# Patient Record
Sex: FEMALE | Race: WHITE | NOT HISPANIC OR LATINO | URBAN - METROPOLITAN AREA
[De-identification: names, ages, dates, MRNs, and addresses within clinical notes are randomized per-mention and may not be internally consistent; named-entity substitution may affect disease eponyms.]

---

## 2017-01-06 ENCOUNTER — FOLLOW UP (OUTPATIENT)
Dept: URBAN - METROPOLITAN AREA CLINIC 33 | Facility: CLINIC | Age: 67
End: 2017-01-06

## 2017-01-06 VITALS
HEIGHT: 66 IN | DIASTOLIC BLOOD PRESSURE: 56 MMHG | BODY MASS INDEX: 19.29 KG/M2 | WEIGHT: 120 LBS | HEART RATE: 72 BPM | SYSTOLIC BLOOD PRESSURE: 98 MMHG

## 2017-01-06 DIAGNOSIS — H02.836: ICD-10-CM

## 2017-01-06 DIAGNOSIS — H02.833: ICD-10-CM

## 2017-01-06 DIAGNOSIS — H04.123: ICD-10-CM

## 2017-01-06 DIAGNOSIS — H35.373: ICD-10-CM

## 2017-01-06 DIAGNOSIS — H43.813: ICD-10-CM

## 2017-01-06 DIAGNOSIS — H25.13: ICD-10-CM

## 2017-01-06 PROCEDURE — G8420 CALC BMI NORM PARAMETERS: HCPCS

## 2017-01-06 PROCEDURE — 92014 COMPRE OPH EXAM EST PT 1/>: CPT

## 2017-01-06 PROCEDURE — G8427 DOCREV CUR MEDS BY ELIG CLIN: HCPCS

## 2017-01-06 PROCEDURE — 92250 FUNDUS PHOTOGRAPHY W/I&R: CPT

## 2017-01-06 PROCEDURE — 1036F TOBACCO NON-USER: CPT

## 2017-01-06 ASSESSMENT — TONOMETRY
OS_IOP_MMHG: 10
OD_IOP_MMHG: 12

## 2017-01-06 ASSESSMENT — VISUAL ACUITY
OD_CC: 20/20-2
OS_CC: 20/20+2

## 2017-12-12 NOTE — PATIENT DISCUSSION
New Prescription: Lotemax (loteprednol etabonate): drops,suspension: 0.5% 1 drop twice a day as directed into both eyes 12-

## 2017-12-12 NOTE — PATIENT DISCUSSION
Continue: Samantha Lake (brinzolamide-brimonidine): drops,suspension: 1-0.2% 1 drop twice a day as directed into both eyes

## 2018-01-18 NOTE — PATIENT DISCUSSION
Continue: Olivia Coates (brinzolamide-brimonidine): drops,suspension: 1-0.2% 1 drop twice a day as directed into both eyes

## 2018-01-18 NOTE — PATIENT DISCUSSION
Continue: Lotemax (loteprednol etabonate): drops,suspension: 0.5% 1 drop twice a day as directed into both eyes 12-

## 2018-03-01 NOTE — PATIENT DISCUSSION
Continue: Winn Piper (brinzolamide-brimonidine): drops,suspension: 1-0.2% 1 drop twice a day as directed into both eyes

## 2018-05-24 NOTE — PATIENT DISCUSSION
Continue: Samantha Lake (brinzolamide-brimonidine): drops,suspension: 1-0.2% 1 drop once a day as directed into left eye

## 2018-07-05 NOTE — PATIENT DISCUSSION
Continue: Hesham Trinidad (brinzolamide-brimonidine): drops,suspension: 1-0.2% 1 drop once a day as directed into left eye

## 2018-10-31 ENCOUNTER — FOLLOW UP (OUTPATIENT)
Dept: URBAN - METROPOLITAN AREA CLINIC 33 | Facility: CLINIC | Age: 68
End: 2018-10-31

## 2018-10-31 VITALS — WEIGHT: 120 LBS | HEIGHT: 66 IN | BODY MASS INDEX: 19.29 KG/M2

## 2018-10-31 DIAGNOSIS — H35.373: ICD-10-CM

## 2018-10-31 DIAGNOSIS — H43.813: ICD-10-CM

## 2018-10-31 PROCEDURE — 92014 COMPRE OPH EXAM EST PT 1/>: CPT

## 2018-10-31 PROCEDURE — 92250 FUNDUS PHOTOGRAPHY W/I&R: CPT

## 2018-10-31 ASSESSMENT — VISUAL ACUITY
OS_CC: 20/20
OD_CC: 20/20

## 2018-10-31 ASSESSMENT — TONOMETRY
OS_IOP_MMHG: 09
OD_IOP_MMHG: 09

## 2019-07-23 NOTE — PATIENT DISCUSSION
POAG, OU: INTRAOCULAR PRESSURE IS WITHIN ACCEPTABLE LIMITS. PT TO RETURN FOR FOLLOW-UP AS SCHEDULED.

## 2019-07-29 NOTE — PATIENT DISCUSSION
Monitor for changes.  Updated GLRx given today. Advised patient of condition of cataracts and discussed symptoms of cataracts worsening and becoming more bothersome. Pt to call if vision changes or becomes more bothered by visual symptoms before next appt.

## 2019-07-29 NOTE — PATIENT DISCUSSION
Continue care with PCP. Monitor blood sugar and A1C levels. Recommended yearly dilated eye exams to monitor for ocular complications. Letter sent to PCP with today's findings.

## 2019-10-22 NOTE — PATIENT DISCUSSION
New Prescription: Jaleel Stewart (brinzolamide-brimonidine): drops,suspension: 1-0.2% 1 drop twice a day as directed into left eye 10-

## 2019-11-01 ENCOUNTER — FOLLOW UP (OUTPATIENT)
Dept: URBAN - METROPOLITAN AREA CLINIC 33 | Facility: CLINIC | Age: 69
End: 2019-11-01

## 2019-11-01 VITALS — BODY MASS INDEX: 19.29 KG/M2 | HEIGHT: 66 IN | WEIGHT: 120 LBS

## 2019-11-01 DIAGNOSIS — H43.813: ICD-10-CM

## 2019-11-01 DIAGNOSIS — H35.373: ICD-10-CM

## 2019-11-01 PROCEDURE — 92134 CPTRZ OPH DX IMG PST SGM RTA: CPT

## 2019-11-01 PROCEDURE — 92014 COMPRE OPH EXAM EST PT 1/>: CPT

## 2019-11-01 PROCEDURE — 92250 FUNDUS PHOTOGRAPHY W/I&R: CPT

## 2019-11-01 ASSESSMENT — VISUAL ACUITY
OD_CC: 20/20+1
OS_CC: 20/20

## 2019-11-01 ASSESSMENT — TONOMETRY
OS_IOP_MMHG: 18
OD_IOP_MMHG: 19

## 2020-03-17 NOTE — PATIENT DISCUSSION
Continue: Gadsden Piper (brinzolamide-brimonidine): drops,suspension: 1-0.2% 1 drop twice a day as directed into left eye 10-

## 2021-01-22 NOTE — PATIENT DISCUSSION
Monitor for changes. Discussed symptoms of cataracts worsening and becoming more bothersome. Pt to call if vision changes or becomes more bothered by visual symptoms before next appt.

## 2021-05-14 ENCOUNTER — FOLLOW UP (OUTPATIENT)
Dept: URBAN - METROPOLITAN AREA CLINIC 33 | Facility: CLINIC | Age: 71
End: 2021-05-14

## 2021-05-14 VITALS — WEIGHT: 122 LBS | HEIGHT: 66 IN | BODY MASS INDEX: 19.61 KG/M2

## 2021-05-14 DIAGNOSIS — H35.373: ICD-10-CM

## 2021-05-14 DIAGNOSIS — H43.813: ICD-10-CM

## 2021-05-14 DIAGNOSIS — H25.13: ICD-10-CM

## 2021-05-14 PROCEDURE — 92014 COMPRE OPH EXAM EST PT 1/>: CPT

## 2021-05-14 PROCEDURE — 92134 CPTRZ OPH DX IMG PST SGM RTA: CPT

## 2021-05-14 PROCEDURE — 92250 FUNDUS PHOTOGRAPHY W/I&R: CPT

## 2021-05-14 ASSESSMENT — VISUAL ACUITY
OS_CC: 20/20
OD_CC: 20/20

## 2021-05-14 ASSESSMENT — TONOMETRY
OS_IOP_MMHG: 13
OD_IOP_MMHG: 12

## 2021-07-22 NOTE — PATIENT DISCUSSION
IOP elevated today. Advised Pt of elevated IOP and importance of adding a drop to reduce and control IOP. Explained elevated IOP will cause optic nerve damage and vision loss. Will reevaluate IOP in 2 weeks on new drop.

## 2021-08-04 NOTE — PATIENT DISCUSSION
IOP improved today with addition of Vyzulta and Pt tolerating. Advised Pt of improvement in IOP and importance of continuing drops to reduce and control IOP to protect from further nerve damage and vision loss.

## 2021-08-04 NOTE — PATIENT DISCUSSION
Monitor for changes. Pt to call if vision changes or becomes more bothered by visual symptoms before next appt.

## 2022-07-21 ENCOUNTER — FOLLOW UP (OUTPATIENT)
Dept: URBAN - METROPOLITAN AREA CLINIC 33 | Facility: CLINIC | Age: 72
End: 2022-07-21

## 2022-07-21 VITALS — WEIGHT: 122 LBS | HEIGHT: 66 IN | BODY MASS INDEX: 19.61 KG/M2

## 2022-07-21 DIAGNOSIS — H43.813: ICD-10-CM

## 2022-07-21 DIAGNOSIS — H35.373: ICD-10-CM

## 2022-07-21 DIAGNOSIS — H04.123: ICD-10-CM

## 2022-07-21 PROCEDURE — 92134 CPTRZ OPH DX IMG PST SGM RTA: CPT

## 2022-07-21 PROCEDURE — 92014 COMPRE OPH EXAM EST PT 1/>: CPT

## 2022-07-21 PROCEDURE — 92250 FUNDUS PHOTOGRAPHY W/I&R: CPT

## 2022-07-21 ASSESSMENT — TONOMETRY
OS_IOP_MMHG: 11
OD_IOP_MMHG: 11

## 2022-07-21 ASSESSMENT — VISUAL ACUITY
OS_CC: 20/20-1
OD_CC: 20/20-2

## 2022-08-01 NOTE — PATIENT DISCUSSION
Continue Simbrinza bid OU and change Vyzulta to Latanoprost qhs OU. Latanoprost sent to pharmacy today.

## 2022-08-01 NOTE — PATIENT DISCUSSION
Pt reports Mariann Denson too expensive and needs alternative.  Will replace Vyzulta with Latanoprost due to cost.

## 2022-10-12 NOTE — PATIENT DISCUSSION
RTC as scheduled in Feb for exam and OCT ONH with [facility] after CE, sooner if problems or changes occur.

## 2022-10-12 NOTE — PATIENT DISCUSSION
Discussed option of iStent implantation or other ROLAND procedureat the time of cataract surgery with the goal of lower IOP or fewer medications.

## 2022-10-24 NOTE — PATIENT DISCUSSION
The patient appears to be a candidate for and elects to proceed with cataract surgery with: Basic + visual correction. Visual Target: [place]. START with OS, Consider OD to Follow. The plan to proceed with cataract surgery in the second eye and lens choice is contingent on confirmation of the first eye results and reassessment of the patient’s complaint after the first eye is completed.

## 2022-10-24 NOTE — PATIENT DISCUSSION
The cataracts are visually significant and impairment from the density of the cataracts are consistent with the patient’s complaints of decreased vision. The patient’s visual symptoms will NOT be adequately improved by a tolerable change in glasses or contact lenses. The patient's visual complaints with activities are consistent with glare findings.  The patient was counseled that removal of the cataract is expected to improve the vision on the operated eye. The patient feels that the cataract is significantly impacting the daily activities and seeks to have clearer vision by electing to proceed with cataract surgery. The risks, benefits, and alternatives to surgery were discussed. The patient’s lifestyle and visual expectations were discussed. The types of intraocular lenses were reviewed with the patient along with a discussion of their various strengths and weaknesses. The patient was informed that with the Advanced vision multifocal type lenses typically promote the least dependency on glasses but that they may still need to wear glasses for some activities such as reading small print or reading in low light. These types of lenses typically provide more convenient vision than quality vision and may be associated with haloes at night which could require weeks or months for adaptation. The patient was informed that with Custom vision the goal is to target specific visual focus points in addition to correcting astigmatic errors for distance, intermediate or near to significantly reduce dependency on glasses for those points of focus. If the patient elects to target uncorrected distance vision, then the patient will necessitate glasses for all near and some intermediate distance activities for best clarity after surgery. With any type of lens choice, the results may need further surgical adjustments to achieve the patient’s desired outcome. The patient understands that there is a possibility of needing a surgical enhancement, glasses or other procedures for additional correction after cataract surgery. The patient medical concerns and other potentially visually limiting factors have been addressed in this consultation and the patient understands that if there are visual changes between the pre-cataract surgery assessment and the planned time of surgery to contact the surgeon.

## 2022-10-24 NOTE — PATIENT DISCUSSION
RTC as scheduled in Feb for exam and OCT ONH with [facility] after CE, sooner if problems or changes occur.

## 2022-11-03 NOTE — PATIENT DISCUSSION
Recommend Istent and Gonitomy at time of cataract surgery OU to further protect optic nerve and better control IOP. Will consider discontinuing Glaucoma drops after surgery if IOP is stable; will continue Glaucoma drops until post op drops are completed.

## 2022-11-03 NOTE — PATIENT DISCUSSION
The patient appears to be a candidate for CUSTOM OD and BASIC +/CUSTOM OS and elects to proceed with cataract surgery with: Custom OD and Basic + OS  visual correction. Visual Target: [place]. Pt will need readers.

## 2022-11-03 NOTE — PATIENT DISCUSSION
RTC as scheduled in Feb for exam and OCT ONH with [facility] after CE, sooner if problems or changes occur.

## 2022-11-03 NOTE — PATIENT DISCUSSION
The plan to proceed with cataract surgery in the second eye OS and lens choice is contingent on confirmation of the first eye results and reassessment of the patient’s complaint after the first eye is completed.

## 2022-11-03 NOTE — PATIENT DISCUSSION
Recommend Istent and Gonitomy at time of cataract surgery OU to further protect optic nerve and better control IOP.

## 2022-11-03 NOTE — PATIENT DISCUSSION
START Artificial Tears: One drop to both eyes 3-4 times daily. We recommend Systane or Refresh lubricating eye drops which can be found at any pharmacy.

## 2022-11-03 NOTE — PATIENT DISCUSSION
After repeat IOL master OD recommend TORIC IOL due to level of astigmatism for best VA potential and less glasses dependency . Pt wishes to proceed .

## 2022-11-15 NOTE — PATIENT DISCUSSION
The patient appears to be a candidate for CUSTOM OD and BASIC +/CUSTOM OS and elects to proceed with cataract surgery with: Custom OD and Basic + OS  visual correction. Visual Target: [place]. Pt will need readers.

## 2022-11-16 NOTE — PATIENT DISCUSSION
Advised area of MDF , pt stated accidently poked her eye with bottle drops . Recommend to be more careful when applying gtts .

## 2022-11-16 NOTE — PATIENT DISCUSSION
RTC as scheduled in Feb for exam and OCT ONH with [id number] after CE, sooner if problems or changes occur.

## 2022-11-16 NOTE — PATIENT DISCUSSION
The patient appears to be a candidate for CUSTOM OD and BASIC +/CUSTOM OS and elects to proceed with cataract surgery with: Custom OD and Basic + OS  visual correction. Visual Target: [place]. Pt will need readers.

## 2022-11-22 NOTE — PATIENT DISCUSSION
The remaining cataract in the 2nd eye is visually significant and impairment from the density of the cataract present is consistent with the patient’s complaint of decreased vision. The patient’s visual symptoms will NOT be adequately improved by a tolerable change in glasses or contact lenses for the non-operated eye. The patient's visual complaints with activities are consistent with glare findings.  The patient has noted significant improvement in their symptoms from cataract surgery on their 1st eye. The patient was counseled that removal of the remaining cataract is expected to improve the vision for their second cataract related eye and overall binocular visual function. The risks, benefits, and alternatives to surgery were discussed and the intraocular lens plan was again reviewed. The patient understands that there is a possibility of needing a surgical enhancement, glasses or other procedures for additional correction after cataract surgery. The patient’s medical concerns and other potentially visually limiting factors have been addressed in the consultation and the patient understands that if there are visual changes between the pre-cataract surgery assessment and the planned time of surgery to contact the surgeon.

## 2022-11-22 NOTE — PATIENT DISCUSSION
RTC as scheduled in Feb for exam and OCT ONH with [facility] after CE, sooner if problems or changes occur.

## 2022-11-22 NOTE — PROCEDURE NOTE: CLINICAL
PROCEDURE NOTE: Punctal Plugs, Quintess Dissolvable (72540B, [id number]) Bilateral Lower Lids. Diagnosis: Dry Eye Syndrome. Prior to treatment, the risks/benefits/alternatives were discussed. The patient wished to proceed with procedure. A time out to confirm the patient, site, and procedure has been achieved. The site has been marked. Temporary collagen plugs were inserted. Patient tolerated procedure well. There were no complications. Post procedure instructions given. Jean Martinez

## 2022-11-22 NOTE — PATIENT DISCUSSION
Recommend plugs BLL . Verbal consent obtained . Plugs placed BLL . Muscle Hinge Flap Text: The defect edges were debeveled with a #15 scalpel blade.  Given the size, depth and location of the defect and the proximity to free margins a muscle hinge flap was deemed most appropriate.  Using a sterile surgical marker, an appropriate hinge flap was drawn incorporating the defect. The area thus outlined was incised with a #15 scalpel blade.  The skin margins were undermined to an appropriate distance in all directions utilizing iris scissors.

## 2022-11-22 NOTE — PATIENT DISCUSSION
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 Recommend increasing gel drops to TID and Systane PM heather at bed time . Drop instructions written and given to pt.

## 2022-11-29 NOTE — PATIENT DISCUSSION
RTC as scheduled in Feb for exam and OCT ONH with [facility] after CE, sooner if problems or changes occur.

## 2022-11-30 NOTE — PATIENT DISCUSSION
RTC as scheduled in Feb for exam and OCT ONH with [facility] after CE, sooner if problems or changes occur.

## 2022-11-30 NOTE — PATIENT DISCUSSION
Advised area of MDF still present , recommend switching drops around . D/C PMN , START lotemax BID . Continue Latanoprost QHS OU . D/C Simbrinza . Drop instructions printed and reviewed with pt .

## 2022-12-21 NOTE — PATIENT DISCUSSION
-Continue statin.    Discussed option of iStent implantation or other ROLAND procedureat the time of cataract surgery with the goal of lower IOP or fewer medications.

## 2022-12-30 NOTE — PATIENT DISCUSSION
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 Recommend increasing gel drops to TID and Systane PM heather at bed time . Drop instructions written and given to pt.

## 2022-12-30 NOTE — PATIENT DISCUSSION
RTC as scheduled in Feb for exam and OCT ONH with [facility] after CE, sooner if problems or changes occur.

## 2023-04-10 ENCOUNTER — EMERGENCY VISIT (OUTPATIENT)
Dept: URBAN - METROPOLITAN AREA CLINIC 26 | Facility: CLINIC | Age: 73
End: 2023-04-10

## 2023-04-10 VITALS — BODY MASS INDEX: 19.99 KG/M2 | HEIGHT: 65 IN | WEIGHT: 120 LBS

## 2023-04-10 DIAGNOSIS — H04.123: ICD-10-CM

## 2023-04-10 DIAGNOSIS — H35.373: ICD-10-CM

## 2023-04-10 DIAGNOSIS — H43.813: ICD-10-CM

## 2023-04-10 PROCEDURE — 92014 COMPRE OPH EXAM EST PT 1/>: CPT

## 2023-04-10 PROCEDURE — 92250 FUNDUS PHOTOGRAPHY W/I&R: CPT

## 2023-04-10 PROCEDURE — 92134 CPTRZ OPH DX IMG PST SGM RTA: CPT

## 2023-04-10 ASSESSMENT — VISUAL ACUITY
OD_CC: 20/20-2
OS_CC: 20/20-1

## 2023-04-10 ASSESSMENT — TONOMETRY
OS_IOP_MMHG: 13
OD_IOP_MMHG: 14

## 2023-06-14 NOTE — PATIENT DISCUSSION
Recommend Istent and Gonitomy at time of cataract surgery OU to further protect optic nerve and better control IOP. Will consider discontinuing Glaucoma drops after surgery if IOP is stable; will continue Glaucoma drops until post op drops are completed. Z Plasty Text: The lesion was extirpated to the level of the fat with a #15 scalpel blade.  Given the location of the defect, shape of the defect and the proximity to free margins a Z-plasty was deemed most appropriate for repair.  Using a sterile surgical marker, the appropriate transposition arms of the Z-plasty were drawn incorporating the defect and placing the expected incisions within the relaxed skin tension lines where possible.    The area thus outlined was incised deep to adipose tissue with a #15 scalpel blade.  The skin margins were undermined to an appropriate distance in all directions utilizing iris scissors.  The opposing transposition arms were then transposed into place in opposite direction and anchored with interrupted buried subcutaneous sutures.

## 2024-05-09 ENCOUNTER — COMPREHENSIVE EXAM (OUTPATIENT)
Dept: URBAN - METROPOLITAN AREA CLINIC 33 | Facility: CLINIC | Age: 74
End: 2024-05-09

## 2024-05-09 DIAGNOSIS — H04.123: ICD-10-CM

## 2024-05-09 DIAGNOSIS — H43.813: ICD-10-CM

## 2024-05-09 DIAGNOSIS — H25.13: ICD-10-CM

## 2024-05-09 DIAGNOSIS — H35.373: ICD-10-CM

## 2024-05-09 DIAGNOSIS — H02.836: ICD-10-CM

## 2024-05-09 DIAGNOSIS — H02.833: ICD-10-CM

## 2024-05-09 PROCEDURE — 92250 FUNDUS PHOTOGRAPHY W/I&R: CPT | Mod: 59

## 2024-05-09 PROCEDURE — 92014 COMPRE OPH EXAM EST PT 1/>: CPT

## 2024-05-09 PROCEDURE — 92134 CPTRZ OPH DX IMG PST SGM RTA: CPT

## 2024-05-09 ASSESSMENT — VISUAL ACUITY
OS_CC: 20/20-2
OD_CC: 20/20-1

## 2024-05-09 ASSESSMENT — TONOMETRY
OS_IOP_MMHG: 10
OD_IOP_MMHG: 12

## 2025-05-22 ENCOUNTER — FOLLOW UP (OUTPATIENT)
Age: 75
End: 2025-05-22

## 2025-05-22 VITALS — HEIGHT: 66 IN | WEIGHT: 114 LBS | BODY MASS INDEX: 18.32 KG/M2

## 2025-05-22 DIAGNOSIS — H35.373: ICD-10-CM

## 2025-05-22 DIAGNOSIS — H02.833: ICD-10-CM

## 2025-05-22 DIAGNOSIS — H02.836: ICD-10-CM

## 2025-05-22 DIAGNOSIS — H25.13: ICD-10-CM

## 2025-05-22 DIAGNOSIS — H04.123: ICD-10-CM

## 2025-05-22 DIAGNOSIS — H43.813: ICD-10-CM

## 2025-05-22 PROCEDURE — 92250 FUNDUS PHOTOGRAPHY W/I&R: CPT | Mod: 59

## 2025-05-22 PROCEDURE — 92014 COMPRE OPH EXAM EST PT 1/>: CPT

## 2025-05-22 PROCEDURE — 92134 CPTRZ OPH DX IMG PST SGM RTA: CPT
